# Patient Record
(demographics unavailable — no encounter records)

---

## 2025-02-25 NOTE — ASSESSMENT
[FreeTextEntry1] : 8 year old female presents for evaluation of recurrent epistaxis. On exam, drying and crusting anterior septum L>R.  Discussed options: 1) moisturization regiment  2) cauterization  - mom elected for option 2, will start with right side today, can follow up in 6 weeks to consider left sided cautery, did not get a clear bleed location, unclear if bleeding will stop  Epistaxis status post endoscopic cauterization - Will give regiment of moisturization and nasal precautions to allow cauterized area to heal and avoid further bleeding. patient was instructed what to do in the case of another bleed

## 2025-02-25 NOTE — HISTORY OF PRESENT ILLNESS
[de-identified] : 8 year old female presents accompanies by mom for evaluation of bleeding b/l  Hx right cautery - did not help  seen previous ENT's happens in school every day  can take up to an hour to stop right side a bit more  using ointment daily for two months with no change  no FH bleeding issues, no easy bruising

## 2025-02-25 NOTE — PROCEDURE
[FreeTextEntry3] : Procedure performed: endoscopic control of epistaxis right  Pre-op/post-op indication: Epistaxis right Verbal and/or written consent obtained from patient -  bleeding infection, pain, septal perforation, continues sx, scarring, crusting, need for further intervention etc Telescope - Rigid glass telescope #0 Anesthesia and/or vasoconstriction was achieved topically by usin% Lidocaine spray and Afrin The scope was introduced in the nasal passage between the middle and inferior turbinates to exam the inferior portion of the middle meatus and the fontanelle, as well as the maxillary ostia.  Next, the scope was passed medically and posteriorly to the middle turbinates to examine the sphenoethmoid recess and the superior turbinate region. Upon visualization the finders are as follows: Nasal Septum: sigmoidal septal deviation. Possible source of bleeding visualized on right mid septum Bilateral exam showed normal Mucosa of the Inferior Turbinate, Middle Turbinate, and Superior Turbinate; scant Mucous, no polyps or masses; Inferior, Super and Middle Meatus were clear Area of possible bleeding right mid septum cauterized with silver nitrate and layered with bacitracin ointment The patient tolerated the procedure well

## 2025-02-25 NOTE — CONSULT LETTER
[FreeTextEntry1] : Dear Dr. ADRIANNE GALLARDO    I had the pleasure of evaluating your patient BRITTANY REYES, thank you for allowing us to participate in their care. please see full note detailing our visit below.  If you have any questions, please do not hesitate to call me and I would be happy to discuss further.       Gideon Roman M.D.   Attending Physician,     Department of Otolaryngology - Head and Neck Surgery   [facility]    Office: [phone]   Fax: [phone]